# Patient Record
Sex: FEMALE | Race: WHITE
[De-identification: names, ages, dates, MRNs, and addresses within clinical notes are randomized per-mention and may not be internally consistent; named-entity substitution may affect disease eponyms.]

---

## 2020-06-04 ENCOUNTER — HOSPITAL ENCOUNTER (OUTPATIENT)
Dept: HOSPITAL 76 - LAB | Age: 25
Discharge: HOME | End: 2020-06-04
Attending: INTERNAL MEDICINE
Payer: COMMERCIAL

## 2020-06-04 ENCOUNTER — HOSPITAL ENCOUNTER (OUTPATIENT)
Dept: HOSPITAL 76 - DI | Age: 25
Discharge: HOME | End: 2020-06-04
Attending: INTERNAL MEDICINE
Payer: COMMERCIAL

## 2020-06-04 DIAGNOSIS — M79.641: Primary | ICD-10-CM

## 2020-06-04 DIAGNOSIS — Z86.79: ICD-10-CM

## 2020-06-04 PROCEDURE — 85651 RBC SED RATE NONAUTOMATED: CPT

## 2020-06-04 PROCEDURE — 36415 COLL VENOUS BLD VENIPUNCTURE: CPT

## 2020-06-04 PROCEDURE — 86140 C-REACTIVE PROTEIN: CPT

## 2020-06-04 NOTE — XRAY REPORT
Reason:  PAIN OF RT HAND

Procedure Date:  06/04/2020   

Accession Number:  [id number] / [id number]                    

Procedure:  XR  - Hand 2 View BILAT CPT Code:  

 

***Final Report***

 

 

FULL RESULT:

 

 

PROCEDURE:  Hand 2 View BILAT

 

INDICATIONS:  PAIN OF RT HAND

 

TECHNIQUE:  2 views of the hand(s) acquired.

 

COMPARISON:  None

 

FINDINGS:

 

Bones:  No fractures or dislocations.  No suspicious bony lesions.

 

Soft tissues:  No suspicious soft tissue calcifications.

 

IMPRESSION:

Source of pain involving the hands bilaterally is not seen. No prior 

trauma, no sign of erosive arthritis.

 

Reviewed by: Bijan Garcia MD on 6/4/2020 3:49 PM PDT

Approved by: Bijan Garcia MD on 6/4/2020 3:49 PM PDT

 

 

Station ID:  [id number]

## 2020-06-11 ENCOUNTER — HOSPITAL ENCOUNTER (OUTPATIENT)
Age: 25
End: 2020-06-11
Payer: COMMERCIAL

## 2020-06-11 DIAGNOSIS — M79.641: Primary | ICD-10-CM

## 2020-06-11 PROCEDURE — 95886 MUSC TEST DONE W/N TEST COMP: CPT

## 2020-06-11 PROCEDURE — 95910 NRV CNDJ TEST 7-8 STUDIES: CPT

## 2020-06-27 ENCOUNTER — HOSPITAL ENCOUNTER (OUTPATIENT)
Age: 25
End: 2020-06-27
Payer: COMMERCIAL

## 2020-06-27 DIAGNOSIS — M79.644: Primary | ICD-10-CM

## 2020-06-27 PROCEDURE — 73218 MRI UPPER EXTREMITY W/O DYE: CPT

## 2020-06-27 NOTE — DI.MRI.S_ITS
PROCEDURE:  MR HAND RT WO CON  
   
INDICATIONS:  Pain in right finger(s)  
   
TECHNIQUE:    
Noncontrast coronal T1 spin echo and T2 fast spin echo with fat saturation, axial proton   
density fast spin echo and T2 fast spin echo with fat saturation, sagittal T1 spin echo   
and STIR through the hand and fingers.    
   
COMPARISON:  [facility], RG, BILATERAL HANDS, 6/04/2020, 12:35.  
   
FINDINGS:    
Image quality: Diagnostic.  
   
Bones: There is no acute fracture, dislocation, or suspicious osseous lesion evident   
involving the osseous structures of the right hand.  The alignment of the osseous   
structures of the hand are within normal limits.  No definite osseous or erosions are   
appreciated.  However, there are mild degenerative changes involving the joints of the   
thumb.  Minimal degenerative cystic changes noted involving the head of the 1st   
metacarpal.  No significant joint effusions are identified.  
   
Soft tissues:  Visualized muscles demonstrate normal bulk and internal signal.  No   
intramuscular masses identified.  No ganglion cysts.  They flexor and extensor tendons of   
the hand are within normal limits.  No fluid is contained within the flexor or extensor   
tendon sheaths.  The median nerve through the carpal tunnel is within normal limits.  The   
ulnar nerve through T1 Canal appears to be within normal limits.  Please note that the   
ligaments of the wrist are not adequately characterized on this examination related to   
the larger field-of-view.  No obvious large abnormalities are appreciated.  The tracheal   
fibrocartilage complex appears to grossly be within normal limits, but also is not well   
characterized.  
   
IMPRESSION:  
1.  Mild degenerative changes of the thumb.  
2.  No acute fractures.  No suspicious osseous lesions.  
3.  No significant soft tissue abnormalities are appreciated.  The flexor and extensor   
tendons are within normal limits.  
   
   
   
Dictated by: Juventino Cunningham M.D. on 6/29/2020 at 10:29       
Approved by: Juventino Cunningham M.D. on 6/29/2020 at 10:34

## 2020-11-20 ENCOUNTER — HOSPITAL ENCOUNTER (OUTPATIENT)
Age: 25
End: 2020-11-20
Payer: COMMERCIAL

## 2020-11-20 DIAGNOSIS — S83.282A: ICD-10-CM

## 2020-11-20 DIAGNOSIS — Q79.60: Primary | ICD-10-CM

## 2020-11-20 PROCEDURE — 73721 MRI JNT OF LWR EXTRE W/O DYE: CPT

## 2020-11-20 PROCEDURE — 93306 TTE W/DOPPLER COMPLETE: CPT

## 2020-11-20 NOTE — DI.MRI.S_ITS
PROCEDURE:  MR KNEE LT WO CON  
   
INDICATIONS:  Diogo-Danlos syndrome, unspecified  
   
TECHNIQUE:    
Noncontrast sagittal PD fast spin echo and T2 fast spin echo with fat saturation,   
sagittal 3-D FLASH with fat saturation; coronal T1 spin echo and PD fast spin echo with   
fat saturation, and axial PD fast spin echo with fat saturation through the knee.    
   
COMPARISON:  [facility], MR, MR KNEE RT WO CON, 11/20/2020, 12:54.  
   
FINDINGS:    
Image quality:  Excellent.    
   
Menisci:  The medial meniscus is intact.  There is amorphous high signal intensity within   
the free edge of the anterior horn lateral meniscus, demonstrating superior and inferior   
articular surface extension, indicating degenerative tearing.  
   
Cruciate ligaments:  The anterior and posterior cruciate ligaments appear intact.    
   
Medial structures:  The medial collateral ligament appears intact.  Visualized portions   
of the pes anserinus tendons appear normal.  No abnormal bursal fluid.    
   
Lateral structures:  The lateral collateral ligament, long and short heads of the biceps   
femoris tendon appear intact.  The popliteus tendon appears normal.  Iliotibial band   
appears normal.    
   
Anterior structures:  The quadriceps and patellar tendons appear intact.  Patella is   
subluxed superolaterally..  No femoral trochlear dysplasia or ventral trochlear   
prominence.  No edema in the infrapatellar fat pad.    
   
Bones and cartilage:  No bone marrow contusions or fractures.  There is indentation   
deformity of the medial patellar facet.  There is mild articular cartilage loss diffusely   
overlying the weight-bearing aspects of the medial femoral condyle and medial tibial   
plateau.  Severe articular cartilage loss overlies the medial aspect of the medial   
patellar facet.  
   
Joint space:  There is physiologic knee joint fluid.  No Baker's cyst.  Normal appearing   
synovial plicae are incidentally noted.    
   
IMPRESSION:    
1. Abnormal appearance of the patella which demonstrates superolateral subluxation.    
There is associated high-grade articular cartilage loss overlying the medial patellar   
facet.  
2. Degenerative tearing of the free edge of the anterior horn lateral meniscus.  
   
   
Dictated by: Sandra Fernando M.D. on 11/20/2020 at 13:27       
Approved by: Sandra Fernando M.D. on 11/20/2020 at 13:30

## 2020-11-20 NOTE — DI.MRI.S_ITS
PROCEDURE:  MR KNEE RT WO CON  
   
INDICATIONS:  Diogo-Danlos syndrome, unspecified  
   
TECHNIQUE:    
Noncontrast sagittal PD fast spin echo and T2 fast spin echo with fat saturation,   
sagittal 3-D FLASH with fat saturation; coronal T1 spin echo and PD fast spin echo with   
fat saturation, and axial PD fast spin echo with fat saturation through the knee.    
   
COMPARISON:  None.  
   
FINDINGS:    
Image quality:  Excellent.    
   
Menisci:  The medial and lateral menisci demonstrate normal morphology and internal   
signal.  The meniscal root ligaments appear intact.    
   
Cruciate ligaments:  The anterior and posterior cruciate ligaments appear intact.    
   
Medial structures:  The medial collateral ligament appears intact.  Visualized portions   
of the pes anserinus tendons appear normal.  No abnormal bursal fluid.    
   
Lateral structures:  The lateral collateral ligament, long and short heads of the biceps   
femoris tendon appear intact.  The popliteus tendon appears normal.  Iliotibial band   
appears normal.    
   
Anterior structures:  The quadriceps and patellar tendons appear intact.  Patellar   
alignment is normal.  No femoral trochlear dysplasia or ventral trochlear prominence.  No   
edema in the infrapatellar fat pad.    
   
Bones and cartilage:  No bone marrow contusions or fractures.  The cartilage of the   
medial and lateral femorotibial compartments, as well as the patellofemoral compartment,   
appears normal in thickness.    
   
Joint space:  There is a small knee joint effusion and a trace Baker's cyst.  Normal   
appearing synovial plicae are incidentally noted.    
   
IMPRESSION:    
1. No evidence of internal derangement.  
2. Small knee joint effusion.  Trace Jarvis's cyst.  
   
   
Dictated by: Sandra Fernando M.D. on 11/20/2020 at 12:52       
Approved by: Sandra Fernando M.D. on 11/20/2020 at 12:54

## 2020-11-20 NOTE — DI.ECHO.S_ITS
"                                    [facility]  
+---------+                        [facility]                        +---------+  
:         :                      [address]                     :         :  
:         :                      [address]                     :         :  
:         :                          [id number]                         :         :  
:         :                       Phone: [phone]                      :         :  
+---------+                        [phone]                        +---------+  
                             Echocardiogram Report  
+-----------------------------------------------------------------------------+  
:Name: BHAVANI LOWRY            Study Date: 2020           Height: 73 in  :  
:[facility] MRN #: [id number]                                    Weight: 155 lb :  
:                             Gender: Female                   BSA: 1.9 m2    :  
:: 1995              Age: 25 yrs                      BP: 126/80 mmHg:  
:Reason For Study: Diogo danlos syndrome                                     :  
:                             Performed By: Elana Nguyen                  :  
:Referring: SYLVIE ALMANZA                                                       :  
+-----------------------------------------------------------------------------+  
Interpretation Summary  
Sinus bradycardia. Heart rate is 54-58 bpm.  
   
Normal LV size, wall thickness, wall motion and LV systolic function. EF is  
60-65%.  
   
Normal chamber sizes.  
   
No significant valvular abnormalities.  
   
Normal aorta dimension.  
   
No cardiovascular manifestations of Diogo-Danlos syndrome identified. No  
prior study available for comparison.  
   
Procedure:   A two-dimensional transthoracic echocardiogram with color flow  
and Doppler was performed. The study quality was technically adequate. There  
is no prior echocardiogram noted for this patient. The patient was in normal  
sinus rhythm during the exam.  
Left Ventricle:   The left ventricle is normal in size and wall thickness. The  
ejection fraction is estimated to be 60-65%.  
Right Ventricle:   The right ventricle is normal in size and function.  
Atria:   The left atrial size is normal. Right atrial size is normal. There is  
no Doppler evidence for an interatrial shunt.  
Mitral Valve:   The mitral valve is normal in structure and function. There is  
mild mitral regurgitation.  
Aortic Valve:   The aortic valve is trileaflet. The aortic valve opens well.  
No aortic regurgitation is present.  
Tricuspid Valve:   The tricuspid valve is normal in structure and function.  
There is a trace or physiologic amount of tricuspid regurgitation. Pulmonary  
artery pressures cannot be estimated because of the lack of a measurable TR  
jet velocity.  
Pulmonic Valve:   The pulmonic valve leaflets are thin and pliable; valve  
motion is normal. There is a trace or physiologic amount of pulmonic  
regurgitation.  
Great Vessels:   The aortic root is normal size. The ascending aorta is normal  
in size. The pulmonary artery is normal size. The IVC is of normal diameter  
and collapses greater than 50% with a sniff. This suggests a low right atrial  
pressure of 3 mm Hg.  
Pericardium/ Pleura   There is no pericardial effusion.  
   
MMode/2D Measurements & Calculations  
LVIDd: 4.8 cm                            LVOT diam: 2.2 cm  
LVIDs: 3.4 cm                            Ao root diam: 2.9 cm  
FS: 28.7 %                               asc Aorta Diam: 2.7 cm  
IVSd: 0.48 cm                            Ao Arch Diam (Prox Trans): 2.4 cm  
LVPWd: 0.71 cm  
LV oquendo. diameter/BSA (cm/m^2): 2.5  
LV sys. diameter/BSA (cm/m^2): 1.8  
         
______________________________________________________________________________  
LA A2 area: 16.9 cm2                     RA long axis: 5.0 cm  
LA A4 area: 16.5 cm2                     RA area: 14.5 cm2  
LA length (vol): 5.2 cm               
[id number]|2020 13:07:00|2020 13:07:00|P.OP_ITS|[facility]|Health Information Management|[id number]|"Operative Date/Time/Diagnoses

## 2020-12-03 ENCOUNTER — HOSPITAL ENCOUNTER (OUTPATIENT)
Age: 25
End: 2020-12-03
Payer: COMMERCIAL

## 2020-12-03 DIAGNOSIS — M25.562: Primary | ICD-10-CM

## 2020-12-03 PROCEDURE — 73562 X-RAY EXAM OF KNEE 3: CPT

## 2020-12-22 ENCOUNTER — HOSPITAL ENCOUNTER (OUTPATIENT)
Age: 25
End: 2020-12-22
Payer: COMMERCIAL

## 2020-12-22 DIAGNOSIS — M25.562: Primary | ICD-10-CM

## 2020-12-22 PROCEDURE — 73580 CONTRAST X-RAY OF KNEE JOINT: CPT

## 2020-12-22 PROCEDURE — 27369 NJX CNTRST KNE ARTHG/CT/MRI: CPT

## 2020-12-22 PROCEDURE — 73722 MRI JOINT OF LWR EXTR W/DYE: CPT

## 2020-12-22 PROCEDURE — 77002 NEEDLE LOCALIZATION BY XRAY: CPT

## 2020-12-22 PROCEDURE — 27370: CPT

## 2020-12-22 NOTE — DI.RAD.S_ITS
PROCEDURE: FL KNEE INJECTION MR/CT LT  
   
COMPARISON: None.  
   
INDICATIONS: KNEE PAIN  
   
TECHNIQUE:   
The indications, alternatives, benefits, risks and complications of the procedure were   
explained to the patient.  Written informed consent was obtained and placed in the chart.   
 The left knee was examined fluoroscopically and a site for needle placement chosen for   
intra into the knee joint from a medial approach.  The skin was prepped and draped in a   
sterile fashion and 1% lidocaine infiltrated from the skin down to the joint capsule.  A   
spinal needle was inserted into the knee joint and a small amount of iodinated contrast   
material was injected to confirm intra-articular placement of the needle tip.  This was   
followed by approximately 40 milliliters of dilute saline solution of gadolinium   
containing MRI contrast agent.  
   
Needle was removed and a dressing was applied.  The patient was given postprocedural   
instructions and sent to the MRI suite for MR imaging.  No immediate complications.  
   
IMPRESSION:  Successful fluoroscopic guided administration of dilute gadolinium solution   
into the left knee joint for MR arthrogram.   
   
Dictated by: Zena Hong MD, PhD on 12/24/2020 at 10:04       
Approved by: Zena Hong MD, PhD on 12/24/2020 at 10:07

## 2020-12-22 NOTE — DI.MRI.S_ITS
PROCEDURE:  MR KNEE LT W CON  
   
INDICATIONS:  KNEE PAIN - LEFT  
   
TECHNIQUE:    
After the administration of 50 mL of dilute intra-articular Gadolinium contrast, sagittal   
T1 spin echo with fat saturation and PD fast spin echo with fat saturation, coronal T1   
spin echo with and without fat saturation, coronal T2 fast spin echo with fat saturation,   
axial PD fast spin echo with fat saturation through the knee.    
   
COMPARISON:  [facility], MR, MR KNEE RT WO CON, 11/20/2020, 12:54.  [facility],   
MR, MR KNEE LT WO CON, 11/20/2020, 13:21.  [facility], CR, XR KNEE LT 3V,   
12/03/2020, 10:00.  
   
FINDINGS:    
Image quality:  Excellent.    
   
Menisci:  The medial and lateral menisci demonstrate normal morphology and internal   
signal.  The meniscal root ligaments appear intact.    
   
Cruciate ligaments:  The anterior and posterior cruciate ligaments appear intact.    
   
Medial structures:  The medial collateral ligament appears intact.  The posterior oblique   
ligament, semimembranosus tendon insertions, oblique popliteal ligament, and   
meniscocapsular junction appear intact.  Visualized portions of the pes anserinus tendons   
appear normal.  No abnormal bursal fluid.    
   
Lateral structures:  The lateral collateral ligament, long and short heads of the biceps   
femoris tendon appear intact.  The popliteus tendon appears normal; the popliteofibular   
ligament appears intact.  The posterosuperior and anteroinferior popliteomeniscal   
fascicles appear intact.  The arcuate and fabellofibular ligaments appear intact around   
the lateral inferior geniculate artery.  Iliotibial band appears normal.    
   
Anterior structures:  The quadriceps and patellar tendons appear intact.  Patellar   
alignment is normal.  No femoral trochlear dysplasia or ventral trochlear prominence.  No   
edema in the infrapatellar fat pad.    
   
Bone and cartilage:  No bone marrow contusions or fractures.  The cartilage of the medial   
and lateral femorotibial compartments appears normal in thickness.  There is near   
full-thickness loss of medial patellar facet articular cartilage.  
   
Joint space:  No Baker's cyst.  Normal appearing synovial plicae are incidentally noted.    
No intra-articular bodies.    
   
   
IMPRESSION:    
   
1. High-grade, near full-thickness medial patellar facet articular cartilage loss.  
   
2. No meniscal tear.  
   
3. No internal derangement.    
   
   
   
Dictated by: Zena Hong MD, PhD on 12/22/2020 at 14:34       
Approved by: Zena Hong MD, PhD on 12/23/2020 at 19:03

## 2021-09-24 ENCOUNTER — HOSPITAL ENCOUNTER (OUTPATIENT)
Age: 26
End: 2021-09-24
Payer: COMMERCIAL

## 2021-09-24 DIAGNOSIS — Z20.822: Primary | ICD-10-CM

## 2021-11-19 ENCOUNTER — HOSPITAL ENCOUNTER (OUTPATIENT)
Age: 26
End: 2021-11-19
Payer: COMMERCIAL

## 2021-11-19 DIAGNOSIS — M25.561: Primary | ICD-10-CM

## 2021-11-19 PROCEDURE — 27369 NJX CNTRST KNE ARTHG/CT/MRI: CPT

## 2021-11-19 PROCEDURE — 77002 NEEDLE LOCALIZATION BY XRAY: CPT

## 2021-11-19 PROCEDURE — 73722 MRI JOINT OF LWR EXTR W/DYE: CPT

## 2022-01-19 ENCOUNTER — HOSPITAL ENCOUNTER (OUTPATIENT)
Age: 27
End: 2022-01-19
Payer: COMMERCIAL

## 2022-01-19 DIAGNOSIS — M25.562: Primary | ICD-10-CM

## 2022-01-19 DIAGNOSIS — S83.092A: ICD-10-CM

## 2022-01-19 DIAGNOSIS — G89.29: ICD-10-CM

## 2022-01-19 PROCEDURE — 73721 MRI JNT OF LWR EXTRE W/O DYE: CPT
